# Patient Record
Sex: FEMALE | Race: BLACK OR AFRICAN AMERICAN | URBAN - METROPOLITAN AREA
[De-identification: names, ages, dates, MRNs, and addresses within clinical notes are randomized per-mention and may not be internally consistent; named-entity substitution may affect disease eponyms.]

---

## 2023-11-10 ENCOUNTER — OFFICE VISIT (OUTPATIENT)
Dept: PRIMARY CARE | Facility: CLINIC | Age: 23
End: 2023-11-10
Payer: COMMERCIAL

## 2023-11-10 VITALS
BODY MASS INDEX: 22.76 KG/M2 | WEIGHT: 145 LBS | HEIGHT: 67 IN | DIASTOLIC BLOOD PRESSURE: 74 MMHG | TEMPERATURE: 97.9 F | OXYGEN SATURATION: 99 % | SYSTOLIC BLOOD PRESSURE: 112 MMHG | HEART RATE: 75 BPM

## 2023-11-10 DIAGNOSIS — Z02.0 SCHOOL PHYSICAL EXAM: Primary | ICD-10-CM

## 2023-11-10 PROCEDURE — 99385 PREV VISIT NEW AGE 18-39: CPT | Performed by: FAMILY MEDICINE

## 2023-11-10 PROCEDURE — 1036F TOBACCO NON-USER: CPT | Performed by: FAMILY MEDICINE

## 2023-11-10 ASSESSMENT — PATIENT HEALTH QUESTIONNAIRE - PHQ9
2. FEELING DOWN, DEPRESSED OR HOPELESS: NOT AT ALL
1. LITTLE INTEREST OR PLEASURE IN DOING THINGS: NOT AT ALL
SUM OF ALL RESPONSES TO PHQ9 QUESTIONS 1 AND 2: 0

## 2023-11-10 NOTE — PROGRESS NOTES
"Subjective   Patient ID: Swetha Danielle is a 23 y.o. female who presents for Annual Exam and Establish Care.    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      Review of Systems   All other systems reviewed and are negative.      Objective   /74 (BP Location: Left arm, Patient Position: Sitting, BP Cuff Size: Adult)   Pulse 75   Temp 36.6 °C (97.9 °F)   Ht 1.702 m (5' 7\")   Wt 65.8 kg (145 lb)   SpO2 99%   BMI 22.71 kg/m²     Physical Exam  Vitals and nursing note reviewed.   HENT:      Mouth/Throat:      Pharynx: Oropharynx is clear.   Cardiovascular:      Rate and Rhythm: Normal rate and regular rhythm.   Pulmonary:      Effort: Pulmonary effort is normal.      Breath sounds: Normal breath sounds.   Abdominal:      Palpations: Abdomen is soft.      Tenderness: There is no abdominal tenderness.   Musculoskeletal:         General: Normal range of motion.      Cervical back: Neck supple.   Lymphadenopathy:      Cervical: No cervical adenopathy.   Neurological:      Mental Status: She is alert.   Psychiatric:         Mood and Affect: Mood normal.         Behavior: Behavior normal.         Thought Content: Thought content normal.         Judgment: Judgment normal.         Assessment/Plan   Diagnoses and all orders for this visit:  School physical exam  Other orders  -     Follow Up In Primary Care - Established; Future    Normal physical exam.  Immunizations : up date  Free of communicable diseases  Titers and TB spot from 11/2022: reviewed.  Patient is medically fit to start her clinicals.     " Health Maintenance Due   Topic Date Due   • DM/CKD Microalbumin  07/10/2021   • Pneumococcal Vaccine 0-64 (2 - PPSV23 if available, else PCV20) 11/16/2021   • COVID-19 Vaccine (4 - Booster for Pfizer series) 02/04/2022       Patient is due for topics listed above, she wishes to proceed with Immunization(s) Pneumococcal and DM/CKD Microalbumin, but is not proceeding with Immunization(s) COVID-19 at this time.

## 2023-11-27 ENCOUNTER — APPOINTMENT (OUTPATIENT)
Dept: PRIMARY CARE | Facility: CLINIC | Age: 23
End: 2023-11-27
Payer: COMMERCIAL

## 2023-11-27 ENCOUNTER — TELEPHONE (OUTPATIENT)
Dept: PRIMARY CARE | Facility: CLINIC | Age: 23
End: 2023-11-27

## 2023-11-27 DIAGNOSIS — Z02.0 SCHOOL PHYSICAL EXAM: Primary | ICD-10-CM

## 2023-11-27 NOTE — TELEPHONE ENCOUNTER
Patient called and asked if you could put TB lab orders in for her, that she needs for school. She just had a school physical done on 11/10/23 with you. She would like a call back once this is put in

## 2023-11-28 ENCOUNTER — LAB (OUTPATIENT)
Dept: LAB | Facility: LAB | Age: 23
End: 2023-11-28
Payer: COMMERCIAL

## 2023-11-28 DIAGNOSIS — Z02.0 SCHOOL PHYSICAL EXAM: ICD-10-CM

## 2023-11-28 PROCEDURE — 36415 COLL VENOUS BLD VENIPUNCTURE: CPT

## 2023-11-28 PROCEDURE — 86481 TB AG RESPONSE T-CELL SUSP: CPT

## 2023-11-30 LAB
NIL(NEG) CONTROL SPOT COUNT: NORMAL
PANEL A SPOT COUNT: 0
PANEL B SPOT COUNT: 0
POS CONTROL SPOT COUNT: NORMAL
T-SPOT. TB INTERPRETATION: NEGATIVE

## 2023-12-01 ENCOUNTER — TELEPHONE (OUTPATIENT)
Dept: PRIMARY CARE | Facility: CLINIC | Age: 23
End: 2023-12-01
Payer: COMMERCIAL

## 2023-12-01 NOTE — TELEPHONE ENCOUNTER
----- Message from Tye SANDY MD sent at 12/1/2023  9:21 AM EST -----  Notify patient TB screening is negative.

## 2024-09-21 ENCOUNTER — HOSPITAL ENCOUNTER (EMERGENCY)
Facility: HOSPITAL | Age: 24
Discharge: HOME | End: 2024-09-21
Attending: EMERGENCY MEDICINE
Payer: COMMERCIAL

## 2024-09-21 VITALS
DIASTOLIC BLOOD PRESSURE: 80 MMHG | HEIGHT: 67 IN | BODY MASS INDEX: 21.97 KG/M2 | TEMPERATURE: 98.8 F | HEART RATE: 88 BPM | SYSTOLIC BLOOD PRESSURE: 136 MMHG | RESPIRATION RATE: 18 BRPM | OXYGEN SATURATION: 98 % | WEIGHT: 140 LBS

## 2024-09-21 DIAGNOSIS — M54.6 ACUTE LEFT-SIDED THORACIC BACK PAIN: Primary | ICD-10-CM

## 2024-09-21 LAB — HCG UR QL IA.RAPID: NEGATIVE

## 2024-09-21 PROCEDURE — 2500000005 HC RX 250 GENERAL PHARMACY W/O HCPCS

## 2024-09-21 PROCEDURE — 2500000004 HC RX 250 GENERAL PHARMACY W/ HCPCS (ALT 636 FOR OP/ED)

## 2024-09-21 PROCEDURE — 96372 THER/PROPH/DIAG INJ SC/IM: CPT

## 2024-09-21 PROCEDURE — 99283 EMERGENCY DEPT VISIT LOW MDM: CPT

## 2024-09-21 PROCEDURE — 2500000001 HC RX 250 WO HCPCS SELF ADMINISTERED DRUGS (ALT 637 FOR MEDICARE OP)

## 2024-09-21 PROCEDURE — 81025 URINE PREGNANCY TEST: CPT | Performed by: EMERGENCY MEDICINE

## 2024-09-21 RX ORDER — LIDOCAINE 50 MG/G
1 PATCH TOPICAL DAILY
Qty: 5 PATCH | Refills: 0 | Status: SHIPPED | OUTPATIENT
Start: 2024-09-21 | End: 2024-09-26

## 2024-09-21 RX ORDER — LIDOCAINE 560 MG/1
1 PATCH PERCUTANEOUS; TOPICAL; TRANSDERMAL ONCE
Status: DISCONTINUED | OUTPATIENT
Start: 2024-09-21 | End: 2024-09-21 | Stop reason: HOSPADM

## 2024-09-21 RX ORDER — METHOCARBAMOL 500 MG/1
500 TABLET, FILM COATED ORAL ONCE
Status: COMPLETED | OUTPATIENT
Start: 2024-09-21 | End: 2024-09-21

## 2024-09-21 RX ORDER — METHOCARBAMOL 500 MG/1
500 TABLET, FILM COATED ORAL 2 TIMES DAILY
Qty: 20 TABLET | Refills: 0 | Status: SHIPPED | OUTPATIENT
Start: 2024-09-21 | End: 2024-10-01

## 2024-09-21 RX ORDER — KETOROLAC TROMETHAMINE 30 MG/ML
15 INJECTION, SOLUTION INTRAMUSCULAR; INTRAVENOUS ONCE
Status: COMPLETED | OUTPATIENT
Start: 2024-09-21 | End: 2024-09-21

## 2024-09-21 RX ADMIN — KETOROLAC TROMETHAMINE 15 MG: 30 INJECTION, SOLUTION INTRAMUSCULAR at 15:20

## 2024-09-21 RX ADMIN — LIDOCAINE 1 PATCH: 4 PATCH TOPICAL at 15:20

## 2024-09-21 RX ADMIN — METHOCARBAMOL 500 MG: 500 TABLET ORAL at 15:20

## 2024-09-21 ASSESSMENT — PAIN DESCRIPTION - PAIN TYPE: TYPE: ACUTE PAIN

## 2024-09-21 ASSESSMENT — PAIN DESCRIPTION - LOCATION
LOCATION: BACK
LOCATION: BACK

## 2024-09-21 ASSESSMENT — PAIN SCALES - WONG BAKER
WONGBAKER_NUMERICALRESPONSE: HURTS LITTLE BIT
WONGBAKER_NUMERICALRESPONSE: HURTS LITTLE BIT

## 2024-09-21 ASSESSMENT — PAIN - FUNCTIONAL ASSESSMENT: PAIN_FUNCTIONAL_ASSESSMENT: WONG-BAKER FACES

## 2024-09-21 NOTE — ED PROVIDER NOTES
History of Present Illness     History provided by: Patient  Limitations to History: None  External Records Reviewed with Brief Summary: Reviewed ml note from 03/05/2024 found to have yeast infection.    HPI:  Swetha Danielle is a 24 y.o. female with no significant past medical hx presenting to the ED today with concern for back pain. 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 She took tylenol and had resolution of symptoms and was pain free yesterday. About an hour prior to arrival patient states she was sitting in bed and went to go  her 2 year old son and instantly had pain. Did not attempt to ambulate afterwards. Pain is located in the upper/mid back. Denies any steroid use, urinary incontinence or retention, any numbness or tingling, fever, chills, chest pain, shortness of breath. She notes significant pain besides that denies any other symptoms. Denies any hx of IV drug use or cancer.    Physical Exam   Triage vitals:  T 37.1 °C (98.8 °F)  HR 88  /80  RR 18  O2 98 %      Physical Exam  Constitutional:       General: She is not in acute distress.     Appearance: Normal appearance. She is not toxic-appearing.   HENT:      Head: Normocephalic and atraumatic.      Mouth/Throat:      Mouth: Mucous membranes are moist.   Eyes:      General: No scleral icterus.     Conjunctiva/sclera: Conjunctivae normal.   Cardiovascular:      Rate and Rhythm: Normal rate and regular rhythm.      Pulses: Normal pulses.   Pulmonary:      Effort: Pulmonary effort is normal.      Breath sounds: Normal breath sounds.   Abdominal:      General: There is no distension.      Palpations: Abdomen is soft.      Tenderness: There is no abdominal tenderness.   Musculoskeletal:         General: Normal range of motion.      Cervical back: Normal range of motion and neck supple.      Comments: Minimal tenderness over the thoracolumbar region. No step offs, skin changes, or deformities  noted.   Skin:     General: Skin is warm and dry.      Capillary Refill: Capillary refill takes less than 2 seconds.   Neurological:      General: No focal deficit present.      Mental Status: She is alert.      Motor: No weakness (5/5 bilateral upper and lower extremity strength).   Psychiatric:         Mood and Affect: Mood normal.         Behavior: Behavior normal.         Thought Content: Thought content normal.         Judgment: Judgment normal.          Medical Decision Making & ED Course   Medical Decision Makin y.o. female with no PMH presenting with back pain after picking up her 2 year old. Was given Toradol, lidocaine, and robaxin with improvement in symptoms. Was able to ambulate to the bathroom and had significant improvement in symptoms. Patient was requesting discharge. No red flag symptoms. Provided with follow up and strict return precautions provided which patient was agreeable with. Precautions about taking robaxin was also provided that she was agreeable with. Patient discharged in stable and improved condition.   ----    Differential diagnoses considered include but are not limited to: muscle strain, back pain, injury     Social Determinants of Health which Significantly Impact Care: None identified    EKG Independent Interpretation: See ED Course    Independent Result Review and Interpretation: See ED course    Chronic conditions affecting the patient's care: See HPI    The patient was discussed with the following consultants/services: None    Care Considerations: See [facility]    ED Course:  Diagnoses as of 24 1629   Acute left-sided thoracic back pain     Disposition   As a result of the work-up, the patient was discharged home.  she was informed of her diagnosis and instructed to come back with any concerns or worsening of condition.  she and was agreeable to the plan as discussed above.  she was given the opportunity to ask questions.  All of the patient's questions were  answered.    Seen and discussed with ED Attending  Trini Parada DO, PGY-2  Emergency Medicine     Trini Parada DO  Resident  09/21/24 1966

## 2024-09-21 NOTE — ED TRIAGE NOTES
Alert and oriented times three, endorses low bvack pain from lifting her son up. Apparently hurt her back last week, did not get seen for this, was taking tylenol, then picked up son, and pulled quote same muscles in back end quote. States pain is 5/10 when laying still but 10/10 when movng at all. States quote I feel like I am dying when it hurts end quote. No focal weakness. No loss of bowel or bladder control. Ambulatory at scene per ems report. Respirations are even and not labored

## 2024-09-21 NOTE — DISCHARGE INSTRUCTIONS
You have been diagnosed with Muscle strain in the emergency department today.  A prescription for robaxin, lidocaine patches has been sent to your pharmacy.  Please use this medication as instructed by your pharmacist.  You should follow-up with your primary care provider within 5-7 days if symptoms don't improve or worsen.  Please return to the emergency department if you begin experiencing any urinary retention, numbness/tingling, severe crushing chest pain, shortness of breath on exertion, passout, severe/sudden onset headache, or strokelike symptoms.     I have provided you a prescription for both Tylenol and ibuprofen.  For better pain management and control you can alternate between the 2 every 3 hours.  For example you take Tylenol now 3 hours later you can take ibuprofen, 3 hours after that you can take Tylenol again, and 3 hours after that ibuprofen again and so on and so forth.  Please do not take the same medication back to back and try to keep a 6-hour gap between each Tylenol dose and each ibuprofen dose.  This should help provide better pain relief.    Last dose of NSAID was 3:20pm

## 2025-06-19 ENCOUNTER — APPOINTMENT (OUTPATIENT)
Dept: CARDIOLOGY | Facility: HOSPITAL | Age: 25
End: 2025-06-19
Payer: COMMERCIAL

## 2025-06-19 ENCOUNTER — APPOINTMENT (OUTPATIENT)
Dept: RADIOLOGY | Facility: HOSPITAL | Age: 25
End: 2025-06-19
Payer: COMMERCIAL

## 2025-06-19 ENCOUNTER — HOSPITAL ENCOUNTER (EMERGENCY)
Facility: HOSPITAL | Age: 25
Discharge: HOME | End: 2025-06-19
Attending: EMERGENCY MEDICINE
Payer: COMMERCIAL

## 2025-06-19 VITALS
OXYGEN SATURATION: 98 % | DIASTOLIC BLOOD PRESSURE: 90 MMHG | HEART RATE: 88 BPM | SYSTOLIC BLOOD PRESSURE: 122 MMHG | RESPIRATION RATE: 18 BRPM | TEMPERATURE: 97.9 F

## 2025-06-19 DIAGNOSIS — N23 RENAL COLIC: Primary | ICD-10-CM

## 2025-06-19 LAB
ALBUMIN SERPL BCP-MCNC: 4 G/DL (ref 3.4–5)
ALP SERPL-CCNC: 55 U/L (ref 33–110)
ALT SERPL W P-5'-P-CCNC: 17 U/L (ref 7–45)
ANION GAP SERPL CALC-SCNC: 11 MMOL/L (ref 10–20)
APPEARANCE UR: ABNORMAL
AST SERPL W P-5'-P-CCNC: 13 U/L (ref 9–39)
B-HCG SERPL-ACNC: <2 MIU/ML
BASOPHILS # BLD AUTO: 0.03 X10*3/UL (ref 0–0.1)
BASOPHILS NFR BLD AUTO: 0.6 %
BILIRUB SERPL-MCNC: 0.7 MG/DL (ref 0–1.2)
BILIRUB UR STRIP.AUTO-MCNC: NEGATIVE MG/DL
BUN SERPL-MCNC: 10 MG/DL (ref 6–23)
CALCIUM SERPL-MCNC: 9 MG/DL (ref 8.6–10.3)
CHLORIDE SERPL-SCNC: 109 MMOL/L (ref 98–107)
CO2 SERPL-SCNC: 22 MMOL/L (ref 21–32)
COLOR UR: ABNORMAL
CREAT SERPL-MCNC: 0.78 MG/DL (ref 0.5–1.05)
EGFRCR SERPLBLD CKD-EPI 2021: >90 ML/MIN/1.73M*2
EOSINOPHIL # BLD AUTO: 0.07 X10*3/UL (ref 0–0.7)
EOSINOPHIL NFR BLD AUTO: 1.4 %
ERYTHROCYTE [DISTWIDTH] IN BLOOD BY AUTOMATED COUNT: 12.8 % (ref 11.5–14.5)
GLUCOSE SERPL-MCNC: 117 MG/DL (ref 74–99)
GLUCOSE UR STRIP.AUTO-MCNC: NORMAL MG/DL
HCT VFR BLD AUTO: 33.7 % (ref 36–46)
HGB BLD-MCNC: 10.6 G/DL (ref 12–16)
IMM GRANULOCYTES # BLD AUTO: 0.01 X10*3/UL (ref 0–0.7)
IMM GRANULOCYTES NFR BLD AUTO: 0.2 % (ref 0–0.9)
KETONES UR STRIP.AUTO-MCNC: NEGATIVE MG/DL
LEUKOCYTE ESTERASE UR QL STRIP.AUTO: ABNORMAL
LIPASE SERPL-CCNC: 17 U/L (ref 9–82)
LYMPHOCYTES # BLD AUTO: 2.12 X10*3/UL (ref 1.2–4.8)
LYMPHOCYTES NFR BLD AUTO: 40.9 %
MAGNESIUM SERPL-MCNC: 1.86 MG/DL (ref 1.6–2.4)
MCH RBC QN AUTO: 26.2 PG (ref 26–34)
MCHC RBC AUTO-ENTMCNC: 31.5 G/DL (ref 32–36)
MCV RBC AUTO: 83 FL (ref 80–100)
MONOCYTES # BLD AUTO: 0.39 X10*3/UL (ref 0.1–1)
MONOCYTES NFR BLD AUTO: 7.5 %
NEUTROPHILS # BLD AUTO: 2.56 X10*3/UL (ref 1.2–7.7)
NEUTROPHILS NFR BLD AUTO: 49.4 %
NITRITE UR QL STRIP.AUTO: NEGATIVE
NRBC BLD-RTO: 0 /100 WBCS (ref 0–0)
PH UR STRIP.AUTO: 6.5 [PH]
PLATELET # BLD AUTO: 208 X10*3/UL (ref 150–450)
POTASSIUM SERPL-SCNC: 3.3 MMOL/L (ref 3.5–5.3)
PROT SERPL-MCNC: 6.7 G/DL (ref 6.4–8.2)
PROT UR STRIP.AUTO-MCNC: ABNORMAL MG/DL
RBC # BLD AUTO: 4.05 X10*6/UL (ref 4–5.2)
RBC # UR STRIP.AUTO: ABNORMAL MG/DL
RBC #/AREA URNS AUTO: >20 /HPF
SODIUM SERPL-SCNC: 139 MMOL/L (ref 136–145)
SP GR UR STRIP.AUTO: 1.02
UROBILINOGEN UR STRIP.AUTO-MCNC: NORMAL MG/DL
WBC # BLD AUTO: 5.2 X10*3/UL (ref 4.4–11.3)
WBC #/AREA URNS AUTO: ABNORMAL /HPF

## 2025-06-19 PROCEDURE — 96361 HYDRATE IV INFUSION ADD-ON: CPT | Performed by: EMERGENCY MEDICINE

## 2025-06-19 PROCEDURE — 96375 TX/PRO/DX INJ NEW DRUG ADDON: CPT | Performed by: EMERGENCY MEDICINE

## 2025-06-19 PROCEDURE — 83735 ASSAY OF MAGNESIUM: CPT

## 2025-06-19 PROCEDURE — 2500000004 HC RX 250 GENERAL PHARMACY W/ HCPCS (ALT 636 FOR OP/ED): Mod: JZ

## 2025-06-19 PROCEDURE — 74177 CT ABD & PELVIS W/CONTRAST: CPT | Performed by: RADIOLOGY

## 2025-06-19 PROCEDURE — 84702 CHORIONIC GONADOTROPIN TEST: CPT

## 2025-06-19 PROCEDURE — 87086 URINE CULTURE/COLONY COUNT: CPT | Mod: PARLAB

## 2025-06-19 PROCEDURE — 99285 EMERGENCY DEPT VISIT HI MDM: CPT | Mod: 25 | Performed by: EMERGENCY MEDICINE

## 2025-06-19 PROCEDURE — 80053 COMPREHEN METABOLIC PANEL: CPT

## 2025-06-19 PROCEDURE — 74177 CT ABD & PELVIS W/CONTRAST: CPT

## 2025-06-19 PROCEDURE — 93005 ELECTROCARDIOGRAM TRACING: CPT

## 2025-06-19 PROCEDURE — 2550000001 HC RX 255 CONTRASTS: Performed by: EMERGENCY MEDICINE

## 2025-06-19 PROCEDURE — 2500000004 HC RX 250 GENERAL PHARMACY W/ HCPCS (ALT 636 FOR OP/ED): Mod: JZ | Performed by: EMERGENCY MEDICINE

## 2025-06-19 PROCEDURE — 83690 ASSAY OF LIPASE: CPT

## 2025-06-19 PROCEDURE — 36415 COLL VENOUS BLD VENIPUNCTURE: CPT

## 2025-06-19 PROCEDURE — 81001 URINALYSIS AUTO W/SCOPE: CPT

## 2025-06-19 PROCEDURE — 96374 THER/PROPH/DIAG INJ IV PUSH: CPT | Mod: 59 | Performed by: EMERGENCY MEDICINE

## 2025-06-19 PROCEDURE — 85025 COMPLETE CBC W/AUTO DIFF WBC: CPT

## 2025-06-19 RX ORDER — KETOROLAC TROMETHAMINE 30 MG/ML
15 INJECTION, SOLUTION INTRAMUSCULAR; INTRAVENOUS ONCE
Status: COMPLETED | OUTPATIENT
Start: 2025-06-19 | End: 2025-06-19

## 2025-06-19 RX ORDER — KETOROLAC TROMETHAMINE 10 MG/1
10 TABLET, FILM COATED ORAL EVERY 6 HOURS PRN
Qty: 12 TABLET | Refills: 0 | Status: SHIPPED | OUTPATIENT
Start: 2025-06-19 | End: 2025-06-22

## 2025-06-19 RX ORDER — ONDANSETRON 8 MG/1
8 TABLET, FILM COATED ORAL EVERY 8 HOURS PRN
Qty: 15 TABLET | Refills: 0 | Status: SHIPPED | OUTPATIENT
Start: 2025-06-19 | End: 2025-06-23 | Stop reason: ALTCHOICE

## 2025-06-19 RX ORDER — TAMSULOSIN HYDROCHLORIDE 0.4 MG/1
0.4 CAPSULE ORAL DAILY
Qty: 10 CAPSULE | Refills: 0 | Status: SHIPPED | OUTPATIENT
Start: 2025-06-19 | End: 2025-06-29

## 2025-06-19 RX ORDER — HALOPERIDOL LACTATE 5 MG/ML
5 INJECTION, SOLUTION INTRAMUSCULAR ONCE
Status: COMPLETED | OUTPATIENT
Start: 2025-06-19 | End: 2025-06-19

## 2025-06-19 RX ORDER — OXYCODONE AND ACETAMINOPHEN 5; 325 MG/1; MG/1
1 TABLET ORAL EVERY 8 HOURS PRN
Qty: 12 TABLET | Refills: 0 | Status: SHIPPED | OUTPATIENT
Start: 2025-06-19 | End: 2025-06-23 | Stop reason: WASHOUT

## 2025-06-19 RX ORDER — ONDANSETRON HYDROCHLORIDE 2 MG/ML
4 INJECTION, SOLUTION INTRAVENOUS ONCE
Status: DISCONTINUED | OUTPATIENT
Start: 2025-06-19 | End: 2025-06-19

## 2025-06-19 RX ORDER — MORPHINE SULFATE 4 MG/ML
4 INJECTION, SOLUTION INTRAMUSCULAR; INTRAVENOUS ONCE
Status: COMPLETED | OUTPATIENT
Start: 2025-06-19 | End: 2025-06-19

## 2025-06-19 RX ADMIN — IOHEXOL 75 ML: 350 INJECTION, SOLUTION INTRAVENOUS at 16:45

## 2025-06-19 RX ADMIN — SODIUM CHLORIDE 1000 ML: 0.9 INJECTION, SOLUTION INTRAVENOUS at 16:28

## 2025-06-19 RX ADMIN — KETOROLAC TROMETHAMINE 15 MG: 30 INJECTION, SOLUTION INTRAMUSCULAR at 16:27

## 2025-06-19 RX ADMIN — MORPHINE SULFATE 4 MG: 4 INJECTION, SOLUTION INTRAMUSCULAR; INTRAVENOUS at 15:34

## 2025-06-19 RX ADMIN — HALOPERIDOL LACTATE 5 MG: 5 INJECTION, SOLUTION INTRAMUSCULAR at 15:34

## 2025-06-19 ASSESSMENT — LIFESTYLE VARIABLES
EVER HAD A DRINK FIRST THING IN THE MORNING TO STEADY YOUR NERVES TO GET RID OF A HANGOVER: NO
HAVE YOU EVER FELT YOU SHOULD CUT DOWN ON YOUR DRINKING: NO
EVER FELT BAD OR GUILTY ABOUT YOUR DRINKING: NO
HAVE PEOPLE ANNOYED YOU BY CRITICIZING YOUR DRINKING: NO
TOTAL SCORE: 0

## 2025-06-19 ASSESSMENT — PAIN SCALES - GENERAL: PAINLEVEL_OUTOF10: 10 - WORST POSSIBLE PAIN

## 2025-06-19 ASSESSMENT — PAIN - FUNCTIONAL ASSESSMENT: PAIN_FUNCTIONAL_ASSESSMENT: 0-10

## 2025-06-19 NOTE — ED PROVIDER NOTES
HPI   Chief Complaint   Patient presents with    Abdominal Pain    Vomiting    Nausea       25-year-old female presenting to the emergency department by EMS from home due to abdominal pain.  Patient reports she started experiencing diffuse abdominal pain around 3 AM that subsided but returned at approximately 1 PM and is severe at this time.  Rates her pain 10 out of 10 currently.  States it is prominently on the right side of her abdomen but is starting to radiate to the left side.  Denies any aggravating or alleviating factors.  Endorses associated nausea and vomiting.  Last bowel movement was reported normal yesterday evening. States she is unable to keep anything down nor has tried to eat or drink much today.  She ate steak and egg salad last night but is unsure if this is contributing as no one else is sick that ate the same thing.  No reported fevers, chills, headache, lightheadedness, dizziness, cough/cold symptoms, shortness of breath, chest pain, diarrhea, urinary symptoms, weakness, numbness or tingling.  Denies prior abdominal surgeries.              Patient History   Medical History[1]  Surgical History[2]  Family History[3]  Social History[4]    Physical Exam   ED Triage Vitals [06/19/25 1459]   Temp Heart Rate Respirations BP   -- 87 18 130/85      Pulse Ox Temp src Heart Rate Source Patient Position   98 % -- Monitor --      BP Location FiO2 (%)     -- --       Physical Exam  Vitals and nursing note reviewed.   Constitutional:       General: She is not in acute distress.     Appearance: Normal appearance. She is not ill-appearing or toxic-appearing.   HENT:      Head: Atraumatic.      Nose: Nose normal.      Mouth/Throat:      Mouth: Mucous membranes are moist.   Eyes:      Extraocular Movements: Extraocular movements intact.      Conjunctiva/sclera: Conjunctivae normal.   Cardiovascular:      Rate and Rhythm: Normal rate and regular rhythm.   Pulmonary:      Effort: Pulmonary effort is normal.       Breath sounds: Normal breath sounds.   Abdominal:      General: Abdomen is flat.      Palpations: Abdomen is soft.      Tenderness: There is generalized abdominal tenderness. There is no right CVA tenderness, left CVA tenderness, guarding or rebound.   Musculoskeletal:         General: Normal range of motion.      Cervical back: Neck supple.   Skin:     General: Skin is warm and dry.      Capillary Refill: Capillary refill takes less than 2 seconds.   Neurological:      Mental Status: She is alert and oriented to person, place, and time.   Psychiatric:         Mood and Affect: Mood normal.           ED Course & MDM                  No data recorded     [place] Coma Scale Score: 15 (06/19/25 1502 : George Briseno RN)                           Medical Decision Making  25-year-old female presenting to the emergency department by EMS from home due to abdominal pain.  Vital signs reviewed and stable.  Patient is overall well-appearing and not in any acute distress.  She is not ill or toxic in appearance.  Lungs are clear to auscultation bilaterally.  Respirations are even and unlabored.  Abdomen is soft, nondistended, with equal bowel sounds throughout.  There is mild diffuse tenderness to palpation predominantly in the RUQ and RLQ.  No guarding or rebound.  No signs of peritonitis.  No CVA tenderness bilaterally.  5 mg Haldol, 4 mg morphine, 4 mg Zofran all administered intravenously.  1L NS bolus administered.  EKG interpreted by me as sinus rhythm, rate 54, QRS 84, QTc 411.  No acute injury pattern.  CBC without leukocytosis, mild anemia present with hemoglobin 10.6 and hematocrit 33.7.  CMP remarkable for mild hypokalemia 3.3, normal renal function and liver enzymes.  Mag WNL.  Lipase 17.  Beta quantitative negative ruling out pregnancy.  Patient signed out to my attending, Dr. Alexander, pending UA and CT abdomen and pelvis results.        Procedure  Procedures       [1]   Past Medical History:  Diagnosis Date     Anemia     History of chlamydia     History of gonorrhea    [2]   Past Surgical History:  Procedure Laterality Date    DILATION AND CURETTAGE OF UTERUS  07/2020    LIP REPAIR  2007   [3]   Family History  Problem Relation Name Age of Onset    No Known Problems Mother      No Known Problems Father      No Known Problems Sister      No Known Problems Brother      No Known Problems Daughter      No Known Problems Son      Breast cancer Mother's Sister  45    No Known Problems Mother's Brother      No Known Problems Father's Sister      No Known Problems Father's Brother      No Known Problems Maternal Grandmother      Heart disease Maternal Grandfather      Diabetes Maternal Grandfather      Hypertension Maternal Grandfather      No Known Problems Paternal Grandmother      No Known Problems Paternal Grandfather      No Known Problems Other     [4]   Social History  Tobacco Use    Smoking status: Never    Smokeless tobacco: Never   Substance Use Topics    Alcohol use: Not on file    Drug use: Not on file        Olga Lidia Silva PA-C  06/19/25 9705

## 2025-06-19 NOTE — ED TRIAGE NOTES
Pt to ED via EMS from with c/o N/V. Per EMS pt ate steak and egg salad last night and woke up at 3am vomiting. Pain in R quad. No cp and no SOB

## 2025-06-20 ENCOUNTER — OFFICE VISIT (OUTPATIENT)
Dept: UROLOGY | Facility: CLINIC | Age: 25
End: 2025-06-20
Payer: COMMERCIAL

## 2025-06-20 VITALS
SYSTOLIC BLOOD PRESSURE: 122 MMHG | WEIGHT: 136.6 LBS | BODY MASS INDEX: 21.44 KG/M2 | HEIGHT: 67 IN | DIASTOLIC BLOOD PRESSURE: 69 MMHG | HEART RATE: 74 BPM

## 2025-06-20 DIAGNOSIS — R10.84 GENERALIZED ABDOMINAL PAIN: ICD-10-CM

## 2025-06-20 DIAGNOSIS — N20.0 KIDNEY CALCULI: ICD-10-CM

## 2025-06-20 DIAGNOSIS — R31.29 MICROSCOPIC HEMATURIA: ICD-10-CM

## 2025-06-20 DIAGNOSIS — N13.2 HYDRONEPHROSIS WITH URINARY OBSTRUCTION DUE TO URETERAL CALCULUS: ICD-10-CM

## 2025-06-20 DIAGNOSIS — N20.1 CALCULUS OF URETER: Primary | ICD-10-CM

## 2025-06-20 LAB
BACTERIA UR CULT: NORMAL
HOLD SPECIMEN: 293
P OFFSET: 189 MS
P ONSET: 144 MS
POC APPEARANCE, URINE: CLEAR
POC BILIRUBIN, URINE: ABNORMAL
POC BLOOD, URINE: ABNORMAL
POC COLOR, URINE: ABNORMAL
POC GLUCOSE, URINE: NEGATIVE MG/DL
POC KETONES, URINE: ABNORMAL MG/DL
POC LEUKOCYTES, URINE: NEGATIVE
POC NITRITE,URINE: NEGATIVE
POC PH, URINE: 6 PH
POC PROTEIN, URINE: ABNORMAL MG/DL
POC SPECIFIC GRAVITY, URINE: >=1.03
POC UROBILINOGEN, URINE: 1 EU/DL
Q ONSET: 220 MS
QRS COUNT: 9 BEATS
QRS DURATION: 84 MS
QT INTERVAL: 434 MS
QTC CALCULATION(BAZETT): 411 MS
QTC FREDERICIA: 418 MS
R AXIS: 33 DEGREES
T AXIS: 2 DEGREES
T OFFSET: 437 MS
VENTRICULAR RATE: 54 BPM

## 2025-06-20 RX ORDER — POLYMYXIN B SULFATE AND TRIMETHOPRIM 1; 10000 MG/ML; [USP'U]/ML
1 SOLUTION OPHTHALMIC 4 TIMES DAILY
COMMUNITY
Start: 2023-08-30 | End: 2025-06-20 | Stop reason: WASHOUT

## 2025-06-20 RX ORDER — NAPROXEN 500 MG/1
500 TABLET ORAL EVERY 12 HOURS PRN
COMMUNITY
Start: 2025-06-12 | End: 2025-06-20 | Stop reason: WASHOUT

## 2025-06-20 RX ORDER — METRONIDAZOLE 500 MG/1
500 TABLET ORAL 2 TIMES DAILY
COMMUNITY
Start: 2023-12-09 | End: 2025-06-20 | Stop reason: WASHOUT

## 2025-06-20 RX ORDER — FLUCONAZOLE 150 MG/1
TABLET ORAL
COMMUNITY
Start: 2024-03-05 | End: 2025-06-20 | Stop reason: WASHOUT

## 2025-06-20 NOTE — PROGRESS NOTES
Subjective   Patient ID: Swetha Danielle is a 25 y.o. female who presents for Nephrolithiasis.    HPI  Patient presents with a 6mm stone at the distal right ureteral adjacent to the UVJ with moderate hydroureteronephrosis.  She went to the ED yesterday for right-sided abd pain.  CT ap with IV contrast also showed 4mm right renal stone and 2mm left renal stone.  CR 0.78, WBC 5.2, UA + for blood/leuks, urine culture contaminated.    Patient has been taking Toradol and its helping, rates her pain a 4-5/10.  Hasn't taken percocet.  This is her first kidney stone.  Pain happened suddenly.  Located in her right lower abdomen.  She hasn't been eating/drinking much.  No dysuria or gross hematuria.  No urinary concerns.  Hasn't started Flomax.  No nausea/vomiting, fever, chills.    Review of Systems  See HPI.    Objective   Physical Exam  Vitals:    06/20/25 1518   BP: 122/69   Pulse: 74     Alert and oriented x3  No acute distress, cooperative  Breathes easily on room air  Normal range of motion  No focal neurological deficits  Appears stated age    Results for orders placed or performed in visit on 06/20/25 (from the past 24 hours)   POCT UA Automated manually resulted   Result Value Ref Range    POC Color, Urine Gloria (A) Straw, Yellow, Light-Yellow    POC Appearance, Urine Clear Clear    POC Glucose, Urine NEGATIVE NEGATIVE mg/dl    POC Bilirubin, Urine SMALL (1+) (A) NEGATIVE    POC Ketones, Urine TRACE (A) NEGATIVE mg/dl    POC Specific Gravity, Urine >=1.030 1.005 - 1.035    POC Blood, Urine LARGE (3+) (A) NEGATIVE    POC PH, Urine 6.0 No Reference Range Established PH    POC Protein, Urine 100 (2+) (A) NEGATIVE mg/dl    POC Urobilinogen, Urine 1.0 0.2, 1.0 EU/DL    Poc Nitrite, Urine NEGATIVE NEGATIVE    POC Leukocytes, Urine NEGATIVE NEGATIVE     Lab Results   Component Value Date    GLUCOSE 117 (H) 06/19/2025    CALCIUM 9.0 06/19/2025     06/19/2025    K 3.3 (L) 06/19/2025    CO2 22 06/19/2025     (H)  06/19/2025    BUN 10 06/19/2025    CREATININE 0.78 06/19/2025     Lab Results   Component Value Date    WBC 5.2 06/19/2025    HGB 10.6 (L) 06/19/2025    HCT 33.7 (L) 06/19/2025    MCV 83 06/19/2025     06/19/2025     === 06/19/25 ===    CT ABDOMEN PELVIS W IV CONTRAST    - Impression -  1.  Right-sided obstructive uropathy with moderate right  hydroureteronephrosis and a 6 mm calculus at the distal right ureter.  2. Decreased enhancement of the right kidney, concerning for  compromised renal function.  3. Bilateral nephrolithiasis.  4. Circumferential wall thickening at the urinary bladder, may be  secondary to underdistention. Please correlate with urinalysis to  exclude superimposed cystitis.  5. Hepatomegaly. Heterogeneous enhancement of the liver with  periportal edema, may be seen with hepatic congestion or hepatitis.  Please correlate with laboratory values.  6. Diffuse colonic wall thickening, may be due to underdistention  versus colitis.  7. Faint ground-glass opacities at the right middle lobe, may be  secondary to atelectasis or developing pneumonia.  8. 7 mm pulmonary nodule at the right lower lobe. Consider follow up  non contrast chest CT at 6-12 months, then consider CT chest at 18-24  months.        MACRO:  Incidental Finding:  A solid non-calcified pulmonary nodule measuring  6-8 mm .  (**-YCF-**)    Instructions:  Consider follow up non contrast chest CT at 6-12  months, then consider CT chest at 18-24 months. (Davonte Feng et  al., Guidelines for management of incidental pulmonary nodules  detected on CT images: From the Fleischner Society 2017, Radiology.  2017 Jul;284 (1):228-243.) [id number]    Signed by: Alaina Bueno 6/19/2025 5:29 PM  Dictation workstation:   [id number]    Assessment/Plan   Diagnoses and all orders for this visit:  Calculus of ureter  -     Follow Up In Urology; Future  Renal colic  -     Referral to Urology  -     POCT UA Automated manually  resulted  Hydronephrosis with urinary obstruction due to ureteral calculus    Patient actively passing a 6mm distal right ureteral stone, tolerating the pain with Toradol.  She will start Flomax and significantly increase her fluid intake.  She will continue to strain all urine.  Report to nearest emergency department for uncontrollable pain/vomiting or fever/chills.  We will do a telehealth in a few days to see if she was able to pass the stone over the weekend, if not, then I will set her up with Dr. Gage for surgical evaluation as the stone is slightly large.  I did inform her of the other small stones in her kidneys, we will discuss surveillance of this after a later date.  Patient agrees with plan and all questions answered.    PLAN:  Start Flomax  Pain meds prn  Increase fluid intake & strain all urine  Telehealth in 3 days    Marcy Hunter PA-C 06/20/25 3:34 PM

## 2025-06-20 NOTE — PATIENT INSTRUCTIONS
Start Tamsulosin (Flomax) to help facilitate passage of your stone.  Increase your water/fluid intake and strain all your urine.  Report to nearest emergency department for uncontrollable pain/vomiting or fever/chills.

## 2025-06-23 ENCOUNTER — TELEMEDICINE (OUTPATIENT)
Dept: UROLOGY | Facility: CLINIC | Age: 25
End: 2025-06-23
Payer: COMMERCIAL

## 2025-06-23 ENCOUNTER — APPOINTMENT (OUTPATIENT)
Dept: UROLOGY | Facility: CLINIC | Age: 25
End: 2025-06-23
Payer: COMMERCIAL

## 2025-06-23 DIAGNOSIS — N13.2 HYDRONEPHROSIS WITH URINARY OBSTRUCTION DUE TO URETERAL CALCULUS: Primary | ICD-10-CM

## 2025-06-23 DIAGNOSIS — R31.29 MICROSCOPIC HEMATURIA: ICD-10-CM

## 2025-06-23 DIAGNOSIS — N20.1 CALCULUS OF URETER: ICD-10-CM

## 2025-06-23 DIAGNOSIS — R10.84 GENERALIZED ABDOMINAL PAIN: ICD-10-CM

## 2025-06-23 DIAGNOSIS — N20.0 KIDNEY CALCULI: ICD-10-CM

## 2025-06-23 PROCEDURE — 99214 OFFICE O/P EST MOD 30 MIN: CPT

## 2025-06-23 NOTE — PROGRESS NOTES
Subjective   Patient ID: Swetha Danielle is a 25 y.o. female.    HPI  Patient presents for 6mm distal right ureteral stone (just adjacent to the UVJ) with moderate hydroureteronephrosis fu attempted MET over the weekend.      She is on Flomax and using pain meds prn.  The day after the visit, she was feeling improved and thought she had passed the stone.  But last night her pain developed again with vomiting.  Reports she was also constipated, resolved with milk of mag.  Has been using Toradol, no narcotics.  Today she's having no pain.  No dysuria.  She had a sharp pain on Saturday while urinating but that went away.  No gross hematuria.  No fever/chills.  Straining all urine but hasn't caught a stone.    Review of Systems  See HPI.    Objective   Physical Exam  Alert and oriented x3  No acute distress, cooperative  Breathes easily on room air  Appears stated age    Assessment/Plan   Diagnoses and all orders for this visit:  Hydronephrosis with urinary obstruction due to ureteral calculus  -     CT abdomen pelvis wo IV contrast; Future  -     Follow Up In Urology; Future  Calculus of ureter  -     Follow Up In Urology  -     CT abdomen pelvis wo IV contrast; Future  -     Follow Up In Urology; Future  Generalized abdominal pain  -     CT abdomen pelvis wo IV contrast; Future  Kidney calculi  -     CT abdomen pelvis wo IV contrast; Future  Microscopic hematuria  -     CT abdomen pelvis wo IV contrast; Future    Unfortunately, it doesn't sound as though the patient passed with stone with MET over the weekend.  Discussed that her stone is very close to the bladder but d/t the size it may not pass spontaneously.  I will get her set up with Dr. Gage for a surgery discussion.  Ureteroscopy with laser lithotripsy discussed in detail including benefits, risks, perioperative pathways.  Patient counseled that this is a 45-minute outpatient procedure without incisions.  Patient also counseled that a ureteral stent would be  placed temporarily for 1-2 weeks and is removed in the office.  In the meantime, she will continue with MET with Flomax, Tylenol or Toradol for more severe pain, increased fluid intake.  ED return precautions again discussed.  We will get a noncontrast CT ap a couple days prior to her visit with him.  Patient agrees with plan and all questions answered.    PLAN:  Continue MET (Flomax, pain meds prn, strain urine, increase fluids)  Fu with Dr. Gage for possible surgery discussion  Noncontrast CT ap couple days prior to appt    Marcy Hunter PA-C 06/23/25 1:07 PM

## 2025-06-26 ENCOUNTER — APPOINTMENT (OUTPATIENT)
Dept: UROLOGY | Facility: CLINIC | Age: 25
End: 2025-06-26
Payer: COMMERCIAL

## 2025-07-03 ENCOUNTER — APPOINTMENT (OUTPATIENT)
Dept: RADIOLOGY | Facility: HOSPITAL | Age: 25
End: 2025-07-03
Payer: COMMERCIAL

## 2025-07-08 ENCOUNTER — APPOINTMENT (OUTPATIENT)
Age: 25
End: 2025-07-08
Payer: COMMERCIAL

## 2025-07-10 ENCOUNTER — OFFICE VISIT (OUTPATIENT)
Dept: PRIMARY CARE | Facility: CLINIC | Age: 25
End: 2025-07-10
Payer: COMMERCIAL

## 2025-07-10 VITALS
TEMPERATURE: 99 F | WEIGHT: 135.6 LBS | HEART RATE: 66 BPM | OXYGEN SATURATION: 100 % | DIASTOLIC BLOOD PRESSURE: 71 MMHG | BODY MASS INDEX: 21.28 KG/M2 | HEIGHT: 67 IN | SYSTOLIC BLOOD PRESSURE: 116 MMHG

## 2025-07-10 DIAGNOSIS — R91.1 LUNG NODULE: Primary | ICD-10-CM

## 2025-07-10 DIAGNOSIS — R91.8 GROUND GLASS OPACITY PRESENT ON IMAGING OF LUNG: ICD-10-CM

## 2025-07-10 PROCEDURE — 99202 OFFICE O/P NEW SF 15 MIN: CPT | Performed by: NURSE PRACTITIONER

## 2025-07-10 PROCEDURE — 1036F TOBACCO NON-USER: CPT | Performed by: NURSE PRACTITIONER

## 2025-07-10 PROCEDURE — 99212 OFFICE O/P EST SF 10 MIN: CPT | Performed by: NURSE PRACTITIONER

## 2025-07-10 PROCEDURE — 3008F BODY MASS INDEX DOCD: CPT | Performed by: NURSE PRACTITIONER

## 2025-07-10 ASSESSMENT — ENCOUNTER SYMPTOMS
OCCASIONAL FEELINGS OF UNSTEADINESS: 0
DEPRESSION: 0
LOSS OF SENSATION IN FEET: 0

## 2025-07-10 ASSESSMENT — PAIN SCALES - GENERAL: PAINLEVEL_OUTOF10: 0-NO PAIN

## 2025-07-10 NOTE — PATIENT INSTRUCTIONS
7 mm noncalcified pleural-based nodule in the right lower lobe noted on CT abdomen and pelvis with IV contrast dated 6/19/2025.  Also faint groundglass opacities noted in the right middle lobe.  Recommend CT chest for completeness.    Patient desires biopsy consideration   Referral to Dr. Tuttle for further evaluation.             [facility]  [address].  [facility] [address]  [place], [place] [address]  Phone [phone]  Fax [phone]  Nurse Coordinator [phone]                                          Welcome to the [facility]    Today was the initial consult with the lung nodule clinic to determine proper recommendations for follow up. Your care is coordinated to ensure timely management.  As you know, early detection of cancer is very important.  Nodules that are large, look suspicious or have changed over time is why further evaluation such as the additional imaging test that we have ordered is needed. Our clinic will work closely with you in choosing the best next step.       What is my next step?  We will assist with scheduling scans, results reviews, and referrals for priority appointments.      Who do I call?  Your care coordinator for the lung nodule clinic can be contacted at [phone]  All scheduling needs can be assisted within the Cardiac Surgery/Thoracic Surgery/Lung Nodule Clinic offices at [phone].              Table  Ping H, Evgeny DP, Sole SUBRAMANIAN, et al. Guidelines for Management of Incidental Pulmonary Nodules Detected on CT Images: From the Fleischner Society 2017. Radiology 2017;284:228-243.

## 2025-07-10 NOTE — PROGRESS NOTES
Subjective   Patient ID: Swetha Danielle is a 25 y.o. female who presents for New Patient Visit (Swetha Danielle has a new visit regarding a lung nodule.  Never used tobacco products.  No personal history of cancer.  Maternal aunt had breast cancer. ).  HPI 25-year-old female presents today for lung nodule clinic.    Smoking history: 10 years off and on 1-2 day  stopped vaping in February  Personal history of cancer: No  Family history of lung cancer: Maternal aunt with breast cancer.    CT abdomen and pelvis with IV contrast dated 6/19/2025  There is a 7 mm noncalcified pleural-based nodule at the right lower  lobe (series 205, axial image 11). Faint ground-glass opacities noted  at the  right middle lobe.  Faint ground-glass opacities at the right middle lobe, may be  secondary to atelectasis or developing pneumonia.    Review of Systems  Review of systems: Present-feeling well. Not present-chills, fatigue and fever.  Respiratory: Not present-difficulty breathing, cough, bloody sputum.  Cardiovascular: Not present-chest pain, palpitations, dyspnea on exertion.  Objective   There were no vitals taken for this visit.     Physical Exam  Gen.: Mental status-alert. Gen. appearance-cooperative, well groomed and consistent with stated age. Not in acute distress or sickly. Orientation-oriented to time, place, purpose and person. Build and nutrition-well-nourished and well-developed. Hydration-well-hydrated.    Integumentary: Color-normal coloration skin. Skin moisture-normal skin moisture.    Chest and lung exam: Auscultation-normal breath sounds, no adventitious lung sounds and normal vocal resonance. Chest wall is normal in shape and non-tender.    Cardiovascular: Auscultation: Regular rate and rhythm. Heart sounds-normal heart sounds, S1-S2. No murmurs appreciated.   Assessment/Plan   Diagnoses and all orders for this visit:  Lung nodule        -     CT chest wo IV contrast; Future        -     Referral to thoracic surgery    Ground glass opacity present on imaging of lung  -     CT chest wo IV contrast; Future

## 2025-07-15 ENCOUNTER — APPOINTMENT (OUTPATIENT)
Dept: RADIOLOGY | Facility: HOSPITAL | Age: 25
End: 2025-07-15
Payer: COMMERCIAL

## 2025-07-18 LAB
P OFFSET: 189 MS
P ONSET: 144 MS
Q ONSET: 220 MS
QRS COUNT: 9 BEATS
QRS DURATION: 84 MS
QT INTERVAL: 434 MS
QTC CALCULATION(BAZETT): 411 MS
QTC FREDERICIA: 418 MS
R AXIS: 33 DEGREES
T AXIS: 2 DEGREES
T OFFSET: 437 MS
VENTRICULAR RATE: 54 BPM

## 2025-07-23 ENCOUNTER — APPOINTMENT (OUTPATIENT)
Dept: RADIOLOGY | Facility: HOSPITAL | Age: 25
End: 2025-07-23
Payer: COMMERCIAL

## 2025-07-24 ENCOUNTER — APPOINTMENT (OUTPATIENT)
Dept: PRIMARY CARE | Facility: CLINIC | Age: 25
End: 2025-07-24
Payer: COMMERCIAL

## 2025-07-29 ENCOUNTER — TELEPHONE (OUTPATIENT)
Dept: PRIMARY CARE | Facility: CLINIC | Age: 25
End: 2025-07-29
Payer: COMMERCIAL

## 2025-08-01 ENCOUNTER — OFFICE VISIT (OUTPATIENT)
Dept: PRIMARY CARE | Facility: CLINIC | Age: 25
End: 2025-08-01
Payer: COMMERCIAL

## 2025-08-01 VITALS
WEIGHT: 135.4 LBS | HEIGHT: 67 IN | SYSTOLIC BLOOD PRESSURE: 122 MMHG | BODY MASS INDEX: 21.25 KG/M2 | OXYGEN SATURATION: 97 % | HEART RATE: 90 BPM | DIASTOLIC BLOOD PRESSURE: 70 MMHG

## 2025-08-01 DIAGNOSIS — Z00.00 WELLNESS EXAMINATION: Primary | ICD-10-CM

## 2025-08-01 PROCEDURE — 3008F BODY MASS INDEX DOCD: CPT | Performed by: EMERGENCY MEDICINE

## 2025-08-01 PROCEDURE — 1036F TOBACCO NON-USER: CPT | Performed by: EMERGENCY MEDICINE

## 2025-08-01 PROCEDURE — 99395 PREV VISIT EST AGE 18-39: CPT | Performed by: EMERGENCY MEDICINE

## 2025-08-01 ASSESSMENT — PATIENT HEALTH QUESTIONNAIRE - PHQ9
SUM OF ALL RESPONSES TO PHQ9 QUESTIONS 1 AND 2: 0
2. FEELING DOWN, DEPRESSED OR HOPELESS: NOT AT ALL
1. LITTLE INTEREST OR PLEASURE IN DOING THINGS: NOT AT ALL

## 2025-08-01 NOTE — PROGRESS NOTES
"Subjective   Patient ID: Swetha Danielle is a 25 y.o. female who presents for Establish Care and Annual Exam.    Assessment/Plan   Problem List Items Addressed This Visit    None    No acute medical issues    No significant past health history    Not on any prescription medicines    I filled out the forms    Patient will fill out the immunization information    Source of history: Nurse, Medical personnel, Medical record, Patient.  History limitation: None.    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 Lungs are clear   Cardiovascular: heart rate normal. No gallop  Back - normal inspection  Skin inspection:Warm  Musculoskeletal : No deformities  Neuro : Limited exam. Baseline    Review of Systems   Comprehensive review of systems as allowed by patient condition and nursing input is negative    Results including lab work, imaging reports were reviewed and wherever possible, independently verified    Family History[3]  Social History[4]  Medical History[5]  Surgical History[6]    Charting was completed using voice recognition technology and may include unintended errors.         [1] No Known Allergies  [2]   No current outpatient medications on file.     No current facility-administered medications for this visit.   [3]   Family History  Problem Relation Name Age of Onset    No " Known Problems Mother      No Known Problems Father      No Known Problems Sister      No Known Problems Brother      No Known Problems Daughter      No Known Problems Son      Breast cancer Mother's Sister  45    Colon cancer Mother's Brother      No Known Problems Father's Sister      No Known Problems Father's Brother      No Known Problems Maternal Grandmother      Heart disease Maternal Grandfather      Diabetes Maternal Grandfather      Hypertension Maternal Grandfather      No Known Problems Paternal Grandmother      No Known Problems Paternal Grandfather      No Known Problems Other     [4]   Social History  Socioeconomic History    Marital status: Single   Tobacco Use    Smoking status: Never    Smokeless tobacco: Never   Vaping Use    Vaping status: Former    Substances: former vape (vaped with nicotine)   Substance and Sexual Activity    Alcohol use: Yes     Comment: occasional    Drug use: Never     Social Drivers of Health     Food Insecurity: Unknown (6/12/2025)    Received from [facility] Vital Sign     Within the past 12 months, you worried that your food would run out before you got the money to buy more.: Never true   [5]   Past Medical History:  Diagnosis Date    Anemia     History of chlamydia     History of gonorrhea     Lung nodule    [6]   Past Surgical History:  Procedure Laterality Date    DILATION AND CURETTAGE OF UTERUS  07/2020    LIP REPAIR  2007